# Patient Record
Sex: FEMALE | Race: WHITE
[De-identification: names, ages, dates, MRNs, and addresses within clinical notes are randomized per-mention and may not be internally consistent; named-entity substitution may affect disease eponyms.]

---

## 2018-05-07 ENCOUNTER — HOSPITAL ENCOUNTER (INPATIENT)
Dept: HOSPITAL 11 - JP.ED | Age: 68
LOS: 5 days | Discharge: HOME | DRG: 336 | End: 2018-05-12
Attending: INTERNAL MEDICINE | Admitting: INTERNAL MEDICINE
Payer: MEDICARE

## 2018-05-07 DIAGNOSIS — E03.9: ICD-10-CM

## 2018-05-07 DIAGNOSIS — R10.9: ICD-10-CM

## 2018-05-07 DIAGNOSIS — R11.2: ICD-10-CM

## 2018-05-07 DIAGNOSIS — Z48.1: ICD-10-CM

## 2018-05-07 DIAGNOSIS — I10: ICD-10-CM

## 2018-05-07 DIAGNOSIS — K56.51: ICD-10-CM

## 2018-05-07 DIAGNOSIS — K92.2: ICD-10-CM

## 2018-05-07 DIAGNOSIS — K63.89: ICD-10-CM

## 2018-05-07 DIAGNOSIS — K56.609: Primary | ICD-10-CM

## 2018-05-07 PROCEDURE — 76705 ECHO EXAM OF ABDOMEN: CPT

## 2018-05-07 PROCEDURE — 81001 URINALYSIS AUTO W/SCOPE: CPT

## 2018-05-07 PROCEDURE — 36415 COLL VENOUS BLD VENIPUNCTURE: CPT

## 2018-05-07 PROCEDURE — 85027 COMPLETE CBC AUTOMATED: CPT

## 2018-05-07 PROCEDURE — 83690 ASSAY OF LIPASE: CPT

## 2018-05-07 PROCEDURE — C9113 INJ PANTOPRAZOLE SODIUM, VIA: HCPCS

## 2018-05-07 PROCEDURE — 80053 COMPREHEN METABOLIC PANEL: CPT

## 2018-05-07 PROCEDURE — 74177 CT ABD & PELVIS W/CONTRAST: CPT

## 2018-05-07 RX ADMIN — FENTANYL CITRATE PRN MCG: 50 INJECTION, SOLUTION INTRAMUSCULAR; INTRAVENOUS at 23:15

## 2018-05-08 PROCEDURE — 3E0T3BZ INTRODUCTION OF ANESTHETIC AGENT INTO PERIPHERAL NERVES AND PLEXI, PERCUTANEOUS APPROACH: ICD-10-PCS | Performed by: SURGERY

## 2018-05-08 PROCEDURE — 0DNJ0ZZ RELEASE APPENDIX, OPEN APPROACH: ICD-10-PCS | Performed by: SURGERY

## 2018-05-08 PROCEDURE — 0D980ZZ DRAINAGE OF SMALL INTESTINE, OPEN APPROACH: ICD-10-PCS | Performed by: SURGERY

## 2018-05-08 PROCEDURE — 0DNN0ZZ RELEASE SIGMOID COLON, OPEN APPROACH: ICD-10-PCS | Performed by: SURGERY

## 2018-05-08 PROCEDURE — 0DB80ZX EXCISION OF SMALL INTESTINE, OPEN APPROACH, DIAGNOSTIC: ICD-10-PCS | Performed by: SURGERY

## 2018-05-08 NOTE — CR
Abdomen 2V AP Flat Upright

 

INDICATION:  follow up sbo

 

COMPARISON:   CT previous day

 

FINDINGS:  3 views. NG tube in the proximal stomach. Dilated air-filled loops of small bowel with ass
ociated air-fluid levels again noted as seen on CT, showing no significant interval change. Excreted 
contrast material in the urinary bladder.

## 2018-05-08 NOTE — PN
DATE OF SERVICE:  05/08/2018

 

SUBJECTIVE:  Loli is a 68-year-old female with a partial small bowel obstruction.  She came

into the emergency room yesterday, around 2100 hours.  She had a sudden onset at 1300 hours

of abdominal pain with nausea and vomiting.  She was admitted to [address] [place] [address] with a small bowel

obstruction with a consult with Surgery Team today.  History of appendectomy, hysterectomy,

and a hernia repair.

 

Currently, Loli states her pain is controlled.  Vital signs have been stable.  Temp max of

98.4.  NG has put out 325 of a thick green drainage.  Urine output 200.

 

PAST MEDICAL HISTORY:  Hypothyroidism and hypertension.

 

MEDICATIONS:  See EMR.

 

ALLERGIES:  HAS ALLERGIES TO ENVIRONMENTAL.

 

 

REVIEW OF SYSTEMS:  HEENT:  Negative.

NECK:  Negative.

HEART:  No chest pain or shortness of breath.

LUNGS:  No cough.

ABDOMEN:  Generalized tenderness in periumbilical area, extending to the right and left mid-

abdomen associated with nausea and vomiting, did have some dry heaves.

:  Negative.

EXTREMITIES:  Negative for any peripheral edema, joint pain or swelling.

NEURO:  No headaches, dizziness, or loss of coordination.

 

Remainder of review of systems negative for any pertinent positives and negatives.

 

OBJECTIVE:  GENERAL:  Marie Thurner is a 68-year-old female.

VITAL SIGNS:  Height is 5 feet 5 inches.  Weight 168 pounds.  BMI is 28.  TPR 98.4, 86, 18,

and blood pressure 153/82.

HEENT:  Negative.

NECK:  Supple.

HEART:  Regular rate and rhythm.

LUNGS:  Clear.

ABDOMEN:  Slightly distended.  Generalized tenderness.

EXTREMITIES:  SCDs are on.  No peripheral edema.

NEURO:  Intact.

PSYCHIATRIC:  Mood and affect appropriate.

 

ASSESSMENT:  Partial small bowel obstruction.

 

PLAN:

1. Schedule; have consent signed for exploratory laparotomy for release of partial small

    bowel obstruction with possible lysis of adhesions and possible bowel resection;

    general anesthesia; case to follow, 05/08/2018; Dashawn Norwood MD.

2. Tap block.

3. Ketamine per protocol.

4. Cefoxitin 2 grams IV on-call to OR approximately noon.

5. Remain n.p.o.

6. We will evaluate p.r.n. or in a.m.

 

 

 

 

Jane Emery PA-C

DD:  05/08/2018 07:47:49

DT:  05/08/2018 08:11:21

Job #:  [id number]

## 2018-05-08 NOTE — CR
Abdomen 1V Upright

 

INDICATION:  CHECK NG PLACEMENT

 

COMPARISON:   None

 

FINDINGS:  Single portable view of the upper abdomen shows NG tube in the proximal stomach with proxi
mal side port at the level of the GE junction.

## 2018-05-09 RX ADMIN — FENTANYL CITRATE PRN MCG: 50 INJECTION, SOLUTION INTRAMUSCULAR; INTRAVENOUS at 08:26

## 2018-05-09 NOTE — PN
DATE OF SERVICE:  05/09/2018

 

SUBJECTIVE:  Loli is postop day one.  She had a temp max of 100.4.  Pain is controlled.

Vital signs otherwise have been stable.

 

REVIEW OF SYSTEMS:  Remainder of review of systems is negative for any pertinent positives

and negatives.

 

OBJECTIVE:  GENERAL:  Marie Thurner is a 68-year-old female.

VITAL SIGNS:  TPR is 98.2, 49, 18, blood pressure 120/65.

HEENT:  Negative.

NECK:  Supple.

HEART:  Regular rate and rhythm.

LUNGS:  Clear.

ABDOMEN:  Dressings dry and intact.  Abdominal binder is on.

EXTREMITIES:  SCDs are on and there is no peripheral edema.

 

ASSESSMENT:  Exploratory laparotomy with lysis of adhesions, small bowel resection, and tube

decompression of small bowel for adhesive small bowel obstruction with focal small bowel

hemorrhage and marked distention of the proximal small bowel.  Date of surgery, 05/08/2018,

Dashawn Norwood M.D.

 

PLAN:

1. Schedule and have consent signed for delayed primary closure for Thursday 05/10/2018.

    TAP block and bolus of ketamine, n.p.o. after midnight.

2. Sips of clear liquids, ice chips.

3. Decrease IV to 125 mL per hour.

4. Leave Bowman in for an accurate intake and output.

5. Good pulmonary toilet.

6. We will evaluate p.r.n. or in a.m.

 

 

 

 

Jane Emery PA-C

DD:  05/09/2018 15:48:13

DT:  05/09/2018 16:24:51

Job #:  [id number]

## 2018-05-10 PROCEDURE — 3E0T3BZ INTRODUCTION OF ANESTHETIC AGENT INTO PERIPHERAL NERVES AND PLEXI, PERCUTANEOUS APPROACH: ICD-10-PCS | Performed by: SURGERY

## 2018-05-10 PROCEDURE — 0WQF0ZZ REPAIR ABDOMINAL WALL, OPEN APPROACH: ICD-10-PCS | Performed by: SURGERY

## 2018-05-10 RX ADMIN — FENTANYL CITRATE PRN MCG: 50 INJECTION, SOLUTION INTRAMUSCULAR; INTRAVENOUS at 06:28

## 2018-05-10 NOTE — PN
DATE OF SERVICE:  05/10/2018

 

SUBJECTIVE:  Loli is a 68-year-old female.  She is postop day #2.  Pain is controlled.

Vital signs stable.  She is n.p.o. for delayed primary closure.

 

OBJECTIVE:  GENERAL:  Marie Thurner is a 68-year-old female.

VITAL SIGNS:  TPR is 98.8, 67, 18, and blood pressure is 124/66.

HEENT:  Negative.

NECK:  Supple.

HEART:  Regular rate and rhythm.

LUNGS:  Clear.

ABDOMEN:  Dressings dry and intact.  Abdominal binder is on.

EXTREMITIES:  Without peripheral edema.

 

ASSESSMENT:  Exploratory laparotomy with lysis of adhesions, small bowel resection, and tube

decompression of small bowel for adhesive small bowel obstruction, focal small bowel

hemorrhage, and marked distention of the proximal small bowel.  Date of surgery 05/08/2018,

Dashawn Norwood MD.

 

PLAN:  Orders to be written after delayed primary closure.

 

 

 

 

Jane Emery PA-C

DD:  05/10/2018 08:08:49

DT:  05/10/2018 08:57:52

Job #:  [id number]

## 2018-05-11 RX ADMIN — OXYCODONE HYDROCHLORIDE AND ACETAMINOPHEN PRN TAB: 5; 325 TABLET ORAL at 14:00

## 2018-05-11 RX ADMIN — OXYCODONE HYDROCHLORIDE AND ACETAMINOPHEN PRN TAB: 5; 325 TABLET ORAL at 18:04

## 2018-05-11 RX ADMIN — OXYCODONE HYDROCHLORIDE AND ACETAMINOPHEN PRN TAB: 5; 325 TABLET ORAL at 22:22

## 2018-05-11 RX ADMIN — FENTANYL CITRATE PRN MCG: 50 INJECTION, SOLUTION INTRAMUSCULAR; INTRAVENOUS at 07:20

## 2018-05-11 NOTE — PN
DATE OF SERVICE:  05/11/2018

 

SUBJECTIVE:  Loli had a delayed primary closure yesterday.  She has been up ambulating.

Blood pressure is better.  Vital signs have been stable.  Pain has been controlled.

 

REVIEW OF SYSTEMS:  Remainder of review of systems is negative for any pertinent positives

and negatives.

 

OBJECTIVE:  GENERAL:  Marie Thurner is a 68-year-old female.  She is alert and orientated.

VITAL SIGNS:  TPR 99.1, 57, 18, blood pressure 144/76.

HEENT:  Negative.

NECK:  Supple.

HEART:  Regular rate and rhythm.

LUNGS:  Clear.

ABDOMEN:  Dressing dry and intact.  Abdominal binder on.

EXTREMITIES:  Without peripheral edema.

 

ASSESSMENT:

1. Exploratory laparotomy, lysis of adhesions, small bowel resection, and tube

    decompression of small bowel for adhesive small bowel obstruction, focal small bowel

    hemorrhage, and marked distention of the proximal small bowel.  Date of surgery

    05/08/2018.  Surgeon, Dashawn Norwood MD.

2. Delayed primary closure, 05/10/2018.

 

PLAN:

1. Continue PCA and continuous pulse oximetry.

2. Full liquid diet.

3. She may shower.

4. Good pulmonary toilet.

5. We will evaluate p.r.n. or in the a.m.

 

 

 

 

Jane Emery PA-C

DD:  05/11/2018 09:40:05

DT:  05/11/2018 11:40:24

Job #:  [id number]

## 2018-05-12 VITALS — SYSTOLIC BLOOD PRESSURE: 160 MMHG | DIASTOLIC BLOOD PRESSURE: 73 MMHG

## 2018-05-12 RX ADMIN — OXYCODONE HYDROCHLORIDE AND ACETAMINOPHEN PRN TAB: 5; 325 TABLET ORAL at 03:45

## 2018-05-12 RX ADMIN — OXYCODONE HYDROCHLORIDE AND ACETAMINOPHEN PRN TAB: 5; 325 TABLET ORAL at 08:13

## 2018-05-14 NOTE — DISCH
FINAL DIAGNOSES:

1. Adhesive small bowel obstruction with focal small bowel hemorrhage.

2. Marked distention of proximal small bowel.

 

SECONDARY DIAGNOSES:

1. History of hypertension.

2. History of parathyroidectomy.

3. History of hyperlipidemia.

 

OPERATIVE PROCEDURE:

1. On 05/08, exploratory laparotomy with lysis of adhesions:

    a.     Small bowel resection.

    b.     Tube decompression of small bowel.

2. On 05/10, delayed primary closure of abdominal incision.

 

HOSPITAL COURSE:  This is a 68-year-old presenting with a picture of small bowel

obstruction.  Previous surgeries included appendectomy and hysterectomy.  On 05/08, the

patient underwent exploratory laparotomy.  She was noted have a closed loop obstruction

within the mid small bowel related to an adhesion.  This adhesion was lysed with bowel and

was quite hemorrhagic and felt has to be resected.  This was done without difficulty.  She

did have quite a bit of distention of the proximal small bowel and an enterotomy tube

decompression was undertaken to facilitate earlier return of the GI function and then make

that anastomosis somewhat safer.  The design of the original incision wound was felt to be

at high risk for wound infection.  Given this, the wound was packed open and delayed primary

closure undertaken on 05/10.  Clinically, she has done well.  At this point, she is moving

her bowels, eating regular diet, and tolerating taking Percocet for pain.  She will be sent

home with home medications plus Percocet 5/325 one to two tabs q.4 hours p.r.n. pain #50 and

Colace 100 mg p.o. b.i.d. while on pain medications.  Follow up will be with Jane Emery at

[facility] on Friday of 05/18/2018.